# Patient Record
Sex: FEMALE | Race: WHITE | HISPANIC OR LATINO | URBAN - METROPOLITAN AREA
[De-identification: names, ages, dates, MRNs, and addresses within clinical notes are randomized per-mention and may not be internally consistent; named-entity substitution may affect disease eponyms.]

---

## 2017-10-02 ENCOUNTER — IMPORTED ENCOUNTER (OUTPATIENT)
Dept: URBAN - METROPOLITAN AREA CLINIC 31 | Facility: CLINIC | Age: 52
End: 2017-10-02

## 2017-10-02 PROCEDURE — 92014 COMPRE OPH EXAM EST PT 1/>: CPT

## 2017-10-02 NOTE — PATIENT DISCUSSION
1.  Refractive error - Get progressives again but try and match original design because sensitive to change. 2.  Return for an appointment in 1 year for comprehensive exam. with Dr. Kem Lozano

## 2019-09-10 ENCOUNTER — IMPORTED ENCOUNTER (OUTPATIENT)
Dept: URBAN - METROPOLITAN AREA CLINIC 31 | Facility: CLINIC | Age: 54
End: 2019-09-10

## 2019-09-10 PROBLEM — H25.13: Noted: 2019-09-10

## 2019-09-10 PROCEDURE — 92014 COMPRE OPH EXAM EST PT 1/>: CPT

## 2019-09-10 PROCEDURE — 92015 DETERMINE REFRACTIVE STATE: CPT

## 2019-09-10 NOTE — PATIENT DISCUSSION
Nuclear Sclerotic Cataract OU: Explained how cataracts can effect vision. Recommend clinical observation. The patient was advised to contact us if any change or worsening of vision. Return for an appointment in 1 year for comprehensive exam. with Dr. Christin Plunkett.

## 2021-05-27 ENCOUNTER — IMPORTED ENCOUNTER (OUTPATIENT)
Dept: URBAN - METROPOLITAN AREA CLINIC 31 | Facility: CLINIC | Age: 56
End: 2021-05-27

## 2021-05-27 PROBLEM — H25.813: Noted: 2021-05-27

## 2021-05-27 PROBLEM — H43.811: Noted: 2021-05-27

## 2021-05-27 PROCEDURE — 99214 OFFICE O/P EST MOD 30 MIN: CPT

## 2021-05-27 PROCEDURE — 92015 DETERMINE REFRACTIVE STATE: CPT

## 2022-04-01 ASSESSMENT — VISUAL ACUITY
OS_PH: CC 20/25
OD_PH: CC 20/30
OU_SC: 20/20-2
OD_SC: 20/20-2
OS_CC: J2
OD_CC: J1
OS_SC: 20/40-2
OS_SC: 20/20-2
OS_GLARE: 20/50MED
OD_SC: 20/60+2
OD_GLARE: 20/30MED
OU_CC: 20/2016''

## 2022-04-01 ASSESSMENT — TONOMETRY
OD_IOP_MMHG: 16
OS_IOP_MMHG: 19
OS_IOP_MMHG: 16
OS_IOP_MMHG: 18
OD_IOP_MMHG: 19
OD_IOP_MMHG: 18

## 2022-06-17 NOTE — PATIENT DISCUSSION
1.  Discussed the risks benefits alternatives and limitations of cataract surgery including infection bleeding loss of vision retinal tears detachment. The patient stated a full understanding and a desire to proceed with the procedure in both eyes. Refractive options were reviewed. Patient has elected to be optimized for distance vision in both eyes. The patient will still need glasses for reading and to possibly fine tune distance vision. Schedule KPE/IOL OS/OD if pt desires. Distance correction Pt to think about will call to schedule if desires surgeryWill need full Admit. Final glasses rx given today. 2. PVD OD: oldReturn for an appointment in 1 year for comprehensive exam. if no sx with Dr. Lynne Go.
PVD OD: Patient was cautioned to call our office immediately if they experience a substantial change in their symptoms such as an increase in floaters persistent flashes loss of visual field (may appear as a shadow or a curtain) or decrease in visual acuity as these may indicate a retinal tear or detachment.   If this is a new problem patient will need to return for re-examination  as determined by the physician
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

## 2022-07-09 ENCOUNTER — TELEPHONE ENCOUNTER (OUTPATIENT)
Dept: URBAN - METROPOLITAN AREA CLINIC 121 | Facility: CLINIC | Age: 57
End: 2022-07-09

## 2022-07-09 RX ORDER — LEVONORGESTREL AND ETHINYL ESTRADIOL 6-5-10
KIT ORAL
Refills: 0 | OUTPATIENT
Start: 2013-04-16 | End: 2014-03-03

## 2022-07-09 RX ORDER — DIAZEPAM 10 MG/1
TABLET ORAL
Refills: 0 | OUTPATIENT
Start: 2013-04-16 | End: 2014-03-03

## 2022-07-09 RX ORDER — RIZATRIPTAN BENZOATE 10 MG/1
TABLET ORAL
Refills: 0 | OUTPATIENT
Start: 2014-03-03 | End: 2019-10-10

## 2022-07-09 RX ORDER — PRAVASTATIN SODIUM 20 MG/1
TABLET ORAL
Refills: 0 | OUTPATIENT
Start: 2013-04-16 | End: 2014-03-03

## 2022-07-09 RX ORDER — ZINC OXIDE 10 %
OINTMENT (GRAM) TOPICAL
Refills: 0 | OUTPATIENT
Start: 2014-03-03 | End: 2019-10-10

## 2022-07-09 RX ORDER — RIZATRIPTAN BENZOATE 10 MG/1
TABLET ORAL
Refills: 0 | OUTPATIENT
Start: 2013-04-16 | End: 2014-03-03

## 2022-07-09 RX ORDER — SUCRALFATE 1 G/1
THREE TIMES A DAY BEFORE MEALS TABLET ORAL THREE TIMES A DAY
Refills: 0 | OUTPATIENT
Start: 2019-10-18 | End: 2019-10-18

## 2022-07-09 RX ORDER — LEVONORGESTREL AND ETHINYL ESTRADIOL 6-5-10
KIT ORAL
Refills: 0 | OUTPATIENT
Start: 2014-03-03 | End: 2019-10-10

## 2022-07-09 RX ORDER — ZINC OXIDE 10 %
OINTMENT (GRAM) TOPICAL
Refills: 0 | OUTPATIENT
Start: 2013-04-16 | End: 2014-03-03

## 2022-07-09 RX ORDER — OMEPRAZOLE 40 MG/1
ONCE A DAY CAPSULE, DELAYED RELEASE ORAL ONCE A DAY
Refills: 0 | OUTPATIENT
Start: 2019-10-10 | End: 2019-10-10

## 2022-07-09 RX ORDER — ESOMEPRAZOLE MAGNESIUM 40 MG
ONCE DAILY/OM CAPSULE,DELAYED RELEASE (ENTERIC COATED) ORAL ONCE A DAY
Refills: 3 | OUTPATIENT
Start: 2008-10-27 | End: 2008-10-27

## 2022-07-09 RX ORDER — DIAZEPAM 10 MG/1
TABLET ORAL
Refills: 0 | OUTPATIENT
Start: 2014-03-03 | End: 2019-10-10

## 2022-07-10 ENCOUNTER — TELEPHONE ENCOUNTER (OUTPATIENT)
Dept: URBAN - METROPOLITAN AREA CLINIC 121 | Facility: CLINIC | Age: 57
End: 2022-07-10

## 2022-07-10 RX ORDER — MECLIZINE HYDROCHLORIDE 12.5 MG/1
THREE TIMES A DAY AS NEEDED FOR NAUSEA TABLET ORAL THREE TIMES A DAY
Refills: 0 | Status: ACTIVE | COMMUNITY
Start: 2019-10-18

## 2022-07-10 RX ORDER — TRAZODONE HYDROCHLORIDE 50 MG/1
TABLET ORAL
Refills: 0 | Status: ACTIVE | COMMUNITY
Start: 2019-10-03

## 2022-07-10 RX ORDER — ONDANSETRON HYDROCHLORIDE 4 MG/1
THREE TIMES A DAY TABLET, FILM COATED ORAL THREE TIMES A DAY
Refills: 0 | Status: ACTIVE | COMMUNITY
Start: 2019-10-10

## 2022-07-10 RX ORDER — PANCRELIPASE LIPASE, PANCRELIPASE PROTEASE, PANCRELIPASE AMYLASE 40000; 126000; 168000 [USP'U]/1; [USP'U]/1; [USP'U]/1
USE AS DIRECTED TAKE 2 CAPSULES WITH EACH MEAL AND 1 WITH SNACKS UP TO 8 CAPSULES PER DAY CAPSULE, DELAYED RELEASE ORAL
Refills: 2 | Status: ACTIVE | COMMUNITY
Start: 2019-10-21

## 2022-07-10 RX ORDER — SUCRALFATE 1 G/1
FOUR TIMES A DAY BEFORE MEALS AND BEDTIME TABLET ORAL
Refills: 0 | Status: ACTIVE | COMMUNITY
Start: 2019-10-18

## 2022-07-10 RX ORDER — SERTRALINE 100 MG/1
TABLET, FILM COATED ORAL
Refills: 0 | Status: ACTIVE | COMMUNITY
Start: 2019-08-28

## 2022-07-10 RX ORDER — MORPHINE SULFATE 15 MG
TABLET ORAL
Refills: 0 | Status: ACTIVE | COMMUNITY
Start: 2019-08-19

## 2022-07-10 RX ORDER — LEVONORGESTREL AND ETHINYL ESTRADIOL 6-5-10
KIT ORAL
Refills: 0 | Status: ACTIVE | COMMUNITY
Start: 2019-09-10

## 2022-07-10 RX ORDER — OMEPRAZOLE 40 MG/1
ONCE A DAY CAPSULE, DELAYED RELEASE ORAL
Refills: 0 | Status: ACTIVE | COMMUNITY
Start: 2019-10-10

## 2022-07-10 RX ORDER — DIAZEPAM 10 MG/1
TABLET ORAL
Refills: 0 | Status: ACTIVE | COMMUNITY
Start: 2019-08-07

## 2022-07-10 RX ORDER — ESOMEPRAZOLE MAGNESIUM 40 MG
ONCE DAILY/OMCALLED UNDER DP BUT THEY REQUEST A DOCTOR MW CHOSEN/OM CAPSULE,DELAYED RELEASE (ENTERIC COATED) ORAL ONCE A DAY
Refills: 3 | Status: ACTIVE | COMMUNITY
Start: 2008-10-27

## 2022-07-30 ENCOUNTER — TELEPHONE ENCOUNTER (OUTPATIENT)
Age: 57
End: 2022-07-30

## 2022-07-31 ENCOUNTER — TELEPHONE ENCOUNTER (OUTPATIENT)
Age: 57
End: 2022-07-31

## 2025-01-08 ENCOUNTER — NEW PATIENT (OUTPATIENT)
Age: 60
End: 2025-01-08

## 2025-01-08 DIAGNOSIS — H52.4: ICD-10-CM

## 2025-01-08 DIAGNOSIS — H52.223: ICD-10-CM

## 2025-01-08 DIAGNOSIS — H52.13: ICD-10-CM

## 2025-01-08 PROCEDURE — 92004 COMPRE OPH EXAM NEW PT 1/>: CPT

## 2025-01-08 PROCEDURE — 92015 DETERMINE REFRACTIVE STATE: CPT
